# Patient Record
Sex: FEMALE | Race: WHITE | NOT HISPANIC OR LATINO | Employment: FULL TIME | URBAN - NONMETROPOLITAN AREA
[De-identification: names, ages, dates, MRNs, and addresses within clinical notes are randomized per-mention and may not be internally consistent; named-entity substitution may affect disease eponyms.]

---

## 2020-05-11 ENCOUNTER — OFFICE VISIT (OUTPATIENT)
Dept: CARDIOLOGY | Facility: CLINIC | Age: 50
End: 2020-05-11

## 2020-05-11 VITALS
HEART RATE: 104 BPM | SYSTOLIC BLOOD PRESSURE: 142 MMHG | HEIGHT: 62 IN | TEMPERATURE: 97.1 F | DIASTOLIC BLOOD PRESSURE: 88 MMHG | WEIGHT: 162.2 LBS | BODY MASS INDEX: 29.85 KG/M2 | OXYGEN SATURATION: 95 %

## 2020-05-11 DIAGNOSIS — R06.02 SHORTNESS OF BREATH: ICD-10-CM

## 2020-05-11 DIAGNOSIS — R07.2 PRECORDIAL PAIN: Primary | ICD-10-CM

## 2020-05-11 DIAGNOSIS — I10 ESSENTIAL HYPERTENSION: ICD-10-CM

## 2020-05-11 PROCEDURE — 99204 OFFICE O/P NEW MOD 45 MIN: CPT | Performed by: PHYSICIAN ASSISTANT

## 2020-05-11 RX ORDER — DOCUSATE SODIUM 100 MG/1
100 CAPSULE, LIQUID FILLED ORAL DAILY
COMMUNITY
Start: 2020-03-19

## 2020-05-11 RX ORDER — TIZANIDINE 4 MG/1
1 TABLET ORAL 3 TIMES DAILY PRN
COMMUNITY
Start: 2020-03-19

## 2020-05-11 RX ORDER — MONTELUKAST SODIUM 10 MG/1
10 TABLET ORAL NIGHTLY
COMMUNITY
Start: 2020-04-08

## 2020-05-11 RX ORDER — LEVOCETIRIZINE DIHYDROCHLORIDE 5 MG/1
5 TABLET, FILM COATED ORAL EVERY EVENING
COMMUNITY
Start: 2020-04-08

## 2020-05-11 RX ORDER — ESTRADIOL 1 MG/1
1 TABLET ORAL DAILY
COMMUNITY
Start: 2020-05-06

## 2020-05-11 RX ORDER — ALBUTEROL SULFATE 90 UG/1
2 AEROSOL, METERED RESPIRATORY (INHALATION) EVERY 4 HOURS PRN
COMMUNITY

## 2020-05-11 RX ORDER — GABAPENTIN 300 MG/1
800 CAPSULE ORAL 3 TIMES DAILY
COMMUNITY
Start: 2020-04-20

## 2020-05-11 RX ORDER — ASPIRIN 81 MG/1
81 TABLET ORAL DAILY
COMMUNITY

## 2020-05-11 RX ORDER — LOSARTAN POTASSIUM 100 MG/1
100 TABLET ORAL DAILY
COMMUNITY
Start: 2020-03-25 | End: 2020-09-23

## 2020-05-11 RX ORDER — HYDRALAZINE HYDROCHLORIDE 25 MG/1
25 TABLET, FILM COATED ORAL 2 TIMES DAILY
COMMUNITY
Start: 2020-04-06 | End: 2020-09-23

## 2020-05-11 RX ORDER — BUPRENORPHINE HYDROCHLORIDE AND NALOXONE HYDROCHLORIDE DIHYDRATE 8; 2 MG/1; MG/1
1.5 TABLET SUBLINGUAL DAILY
COMMUNITY
Start: 2020-05-01

## 2020-05-11 RX ORDER — OMEPRAZOLE 40 MG/1
40 CAPSULE, DELAYED RELEASE ORAL DAILY
COMMUNITY
Start: 2020-04-08

## 2020-05-11 RX ORDER — BUDESONIDE AND FORMOTEROL FUMARATE DIHYDRATE 160; 4.5 UG/1; UG/1
2 AEROSOL RESPIRATORY (INHALATION)
COMMUNITY

## 2020-05-11 NOTE — PATIENT INSTRUCTIONS
What You Need to Know About Electronic Cigarettes  Electronic cigarettes, or e-cigarettes, are battery-operated devices that deliver nicotine to your body. They come in many shapes, including in the shape of a cigarette, pipe, pen, and even a USB memory stick. E-cigarettes have a cartridge that contains a liquid form of nicotine. When you use the device, the liquid heats up. It then becomes a vapor. Inhaling this vapor is called vaping.  While e-cigarettes do not contain tar and the same cancer-causing chemicals that are in tobacco cigarettes, they may contain other harmful and cancer-causing chemicals, such as formaldehyde or acetaldehyde. Nicotine is thought to increase your risk for certain types of cancer. Many e-cigarettes have chemical colorings and flavorings. It is not clear how much nicotine you get when vaping. The health effects of vaping are not completely known.  Some people may use e-cigarettes in order to quit smoking tobacco. However, this has not been proven to work, and the Food and Drug Administration (FDA) has not approved e-cigarettes for this purpose.  How can using electronic cigarettes affect me?  · E-cigarettes contain nicotine, which is a very addictive drug. Vaping may make you crave nicotine. Nicotine:  ? Changes your blood sugar levels.  ? Increases your heart rate, blood pressure, and breathing rate.  ? Increases your risk of developing blood clots (hypercoagulable state) and diabetes.  · If you smoke e-cigarettes, you may be more likely to start smoking or to smoke more tobacco cigarettes.  · Becoming addicted to nicotine may make your brain more sensitive to other addictive drugs. You may move to other addictive substances.  · If you are pregnant, the nicotine in e-cigarettes may be harmful to your baby. Nicotine can cause:  ? Brain or lung problems for your baby.  ? Your baby to be born too early.  ? Your baby to be born with a low birth weight.  · If you are a child or a teen,  vaping may affect your memory or lower your attention span.  · You may be in danger of overdosing on nicotine. Nicotine poisoning can cause nausea, vomiting, seizures, and trouble breathing.  What are the benefits of stopping vaping?  If you stop vaping, you can avoid:  · Getting addicted to nicotine.  · Having nicotine side effects.  · Getting nicotine poisoning.  · Being exposed to dangerous chemicals.  · Increasing your risk of health problems.  · Increasing your baby's risk of health problems, if you are pregnant.  · Being more likely to use other addictive substances.  What steps can I take to stop vaping?  If you can stop vaping on your own, do it before you become addicted to nicotine. If you need help stopping, ask your health care provider. There are three effective ways to fight nicotine addiction:  · Nicotine replacement therapy. Using nicotine gum or a nicotine patch blocks your craving for nicotine. Over time, you can reduce the amount of nicotine you use until you can stop using nicotine completely without having cravings.  · Prescription medicines approved to fight nicotine addiction. These stop nicotine cravings or block the effects of nicotine.  · Behavioral therapy. This may include:  ? A self-help smoking cessation program.  ? Individual or group therapy.  ? A smoking cessation support group.    Where can I get support?  You can get support at these sites:  · U.S. National Library of Medicine: https://medlineplus.gov/ency/article/595159.htm  · [place]. Department of Health and Human Services: https://smokefree.gov  · American Lung Association: http://www.lung.org/stop-smoking/r-tgyi-dz-quit/how-to-quit-smoking.html  Where can I get more information?  Learn more about e-cigarettes from:  · [place].S. National Institutes of Health: https://www.drugabuse.gov/publications/drugfacts/nqmyxjfkmg-xunedklabq-g-cigarettes  · U.S. Department of Health and Human Services:  http://betobaccofree.hhs.gov/about-tobacco/Electronic-Cigarettes  Summary  · E-cigarettes can cause nicotine addiction.  · E-cigarettes are not approved as a way to stop smoking.  · E-cigarettes are not a risk-free alternative to smoking tobacco.  · There are ways to fight nicotine addiction.  · Talk to your health care provider if you are unable to stop vaping on your own.  This information is not intended to replace advice given to you by your health care provider. Make sure you discuss any questions you have with your health care provider.  Document Released: 04/10/2017 Document Revised: 09/06/2017 Document Reviewed: 12/09/2016  [employer] Interactive Patient Education © 2020 [employer]  Fat and Cholesterol Restricted Eating Plan  Getting too much fat and cholesterol in your diet may cause health problems. Choosing the right foods helps keep your fat and cholesterol at normal levels. This can keep you from getting certain diseases.  Your doctor may recommend an eating plan that includes:  · Total fat: ______% or less of total calories a day.  · Saturated fat: ______% or less of total calories a day.  · Cholesterol: less than _________mg a day.  · Fiber: ______g a day.  What are tips for following this plan?  Meal planning  · At meals, divide your plate into four equal parts:  ? Fill one-half of your plate with vegetables and green salads.  ? Fill one-fourth of your plate with whole grains.  ? Fill one-fourth of your plate with low-fat (lean) protein foods.  · Eat fish that is high in omega-3 fats at least two times a week. This includes mackerel, tuna, sardines, and salmon.  · Eat foods that are high in fiber, such as whole grains, beans, apples, broccoli, carrots, peas, and barley.  General tips    · Work with your doctor to lose weight if you need to.  · Avoid:  ? Foods with added sugar.  ? Fried foods.  ? Foods with partially hydrogenated oils.  · Limit alcohol intake to no more than 1 drink a day for  "nonpregnant women and 2 drinks a day for men. One drink equals 12 oz of beer, 5 oz of wine, or 1½ oz of hard liquor.  Reading food labels  · Check food labels for:  ? Trans fats.  ? Partially hydrogenated oils.  ? Saturated fat (g) in each serving.  ? Cholesterol (mg) in each serving.  ? Fiber (g) in each serving.  · Choose foods with healthy fats, such as:  ? Monounsaturated fats.  ? Polyunsaturated fats.  ? Omega-3 fats.  · Choose grain products that have whole grains. Look for the word \"whole\" as the first word in the ingredient list.  Cooking  · Cook foods using low-fat methods. These include baking, boiling, grilling, and broiling.  · Eat more home-cooked foods. Eat at restaurants and buffets less often.  · Avoid cooking using saturated fats, such as butter, cream, palm oil, palm kernel oil, and coconut oil.  Recommended foods    Fruits  · All fresh, canned (in natural juice), or frozen fruits.  Vegetables  · Fresh or frozen vegetables (raw, steamed, roasted, or grilled). Green salads.  Grains  · Whole grains, such as whole wheat or whole grain breads, crackers, cereals, and pasta. Unsweetened oatmeal, bulgur, barley, quinoa, or brown rice. Corn or whole wheat flour tortillas.  Meats and other protein foods  · Ground beef (85% or leaner), grass-fed beef, or beef trimmed of fat. Skinless chicken or turkey. Ground chicken or turkey. Pork trimmed of fat. All fish and seafood. Egg whites. Dried beans, peas, or lentils. Unsalted nuts or seeds. Unsalted canned beans. Nut butters without added sugar or oil.  Dairy  · Low-fat or nonfat dairy products, such as skim or 1% milk, 2% or reduced-fat cheeses, low-fat and fat-free ricotta or cottage cheese, or plain low-fat and nonfat yogurt.  Fats and oils  · Tub margarine without trans fats. Light or reduced-fat mayonnaise and salad dressings. Avocado. Olive, canola, sesame, or safflower oils.  The items listed above may not be a complete list of foods and beverages you can " eat. Contact a dietitian for more information.  Foods to avoid  Fruits  · Canned fruit in heavy syrup. Fruit in cream or butter sauce. Fried fruit.  Vegetables  · Vegetables cooked in cheese, cream, or butter sauce. Fried vegetables.  Grains  · White bread. White pasta. White rice. Cornbread. Bagels, pastries, and croissants. Crackers and snack foods that contain trans fat and hydrogenated oils.  Meats and other protein foods  · Fatty cuts of meat. Ribs, chicken wings, dalton, sausage, bologna, salami, chitterlings, fatback, hot dogs, bratwurst, and packaged lunch meats. Liver and organ meats. Whole eggs and egg yolks. Chicken and turkey with skin. Fried meat.  Dairy  · Whole or 2% milk, cream, half-and-half, and cream cheese. Whole milk cheeses. Whole-fat or sweetened yogurt. Full-fat cheeses. Nondairy creamers and whipped toppings. Processed cheese, cheese spreads, and cheese curds.  Beverages  · Alcohol. Sugar-sweetened drinks such as sodas, lemonade, and fruit drinks.  Fats and oils  · Butter, stick margarine, lard, shortening, ghee, or dalton fat. Coconut, palm kernel, and palm oils.  Sweets and desserts  · Corn syrup, sugars, honey, and molasses. Candy. Jam and jelly. Syrup. Sweetened cereals. Cookies, pies, cakes, donuts, muffins, and ice cream.  The items listed above may not be a complete list of foods and beverages you should avoid. Contact a dietitian for more information.  Summary  · Choosing the right foods helps keep your fat and cholesterol at normal levels. This can keep you from getting certain diseases.  · At meals, fill one-half of your plate with vegetables and green salads.  · Eat high-fiber foods, like whole grains, beans, apples, carrots, peas, and barley.  · Limit added sugar, saturated fats, alcohol, and fried foods.  This information is not intended to replace advice given to you by your health care provider. Make sure you discuss any questions you have with your health care  "provider.  Document Released: 06/18/2013 Document Revised: 08/21/2019 Document Reviewed: 09/04/2018  [employer] Interactive Patient Education © 2020 [employer]  BMI for Adults    Body mass index (BMI) is a number that is calculated from a person's weight and height. BMI may help to estimate how much of a person's weight is composed of fat. BMI can help identify those who may be at higher risk for certain medical problems.  How is BMI used with adults?  BMI is used as a screening tool to identify possible weight problems. It is used to check whether a person is obese, overweight, healthy weight, or underweight.  How is BMI calculated?  BMI measures your weight and compares it to your height. This can be done either in English (U[place] or metric measurements. Note that charts are available to help you find your BMI quickly and easily without having to do these calculations yourself.  To calculate your BMI in English ([place] measurements, your health care provider will:  1. Measure your weight in pounds (lb).  2. Multiply the number of pounds by 703.  ? For example, for a person who weighs 180 lb, multiply that number by 703, which equals 126,540.  3. Measure your height in inches (in). Then multiply that number by itself to get a measurement called \"inches squared.\"  ? For example, for a person who is 70 in tall, the \"inches squared\" measurement is 70 in x 70 in, which equals 4900 inches squared.  4. Divide the total from Step 2 (number of lb x 703) by the total from Step 3 (inches squared): 126,540 ÷ 4900 = 25.8. This is your BMI.  To calculate your BMI in metric measurements, your health care provider will:  1. Measure your weight in kilograms (kg).  2. Measure your height in meters (m). Then multiply that number by itself to get a measurement called \"meters squared.\"  ? For example, for a person who is 1.75 m tall, the \"meters squared\" measurement is 1.75 m x 1.75 m, which is equal to 3.1 meters squared.  3. Divide " the number of kilograms (your weight) by the meters squared number. In this example: 70 ÷ 3.1 = 22.6. This is your BMI.  How is BMI interpreted?  To interpret your results, your health care provider will use BMI charts to identify whether you are underweight, normal weight, overweight, or obese. The following guidelines will be used:  · Underweight: BMI less than 18.5.  · Normal weight: BMI between 18.5 and 24.9.  · Overweight: BMI between 25 and 29.9.  · Obese: BMI of 30 and above.  Please note:  · Weight includes both fat and muscle, so someone with a muscular build, such as an athlete, may have a BMI that is higher than 24.9. In cases like these, BMI is not an accurate measure of body fat.  · To determine if excess body fat is the cause of a BMI of 25 or higher, further assessments may need to be done by a health care provider.  · BMI is usually interpreted in the same way for men and women.  Why is BMI a useful tool?  BMI is useful in two ways:  · Identifying a weight problem that may be related to a medical condition, or that may increase the risk for medical problems.  · Promoting lifestyle and diet changes in order to reach a healthy weight.  Summary  · Body mass index (BMI) is a number that is calculated from a person's weight and height.  · BMI may help to estimate how much of a person's weight is composed of fat. BMI can help identify those who may be at higher risk for certain medical problems.  · BMI can be measured using English measurements or metric measurements.  · To interpret your results, your health care provider will use BMI charts to identify whether you are underweight, normal weight, overweight, or obese.  This information is not intended to replace advice given to you by your health care provider. Make sure you discuss any questions you have with your health care provider.  Document Released: 08/29/2005 Document Revised: 10/31/2018 Document Reviewed: 10/31/2018  [employer] Interactive Patient  Education © 2020 [employer] Inc.

## 2020-05-11 NOTE — PROGRESS NOTES
Subjective   Meena Goff is a 50 y.o. female     Chief Complaint   Patient presents with   • Establish Care     Here to establish care        HPI  The patient presents today for initial evaluation.  She is referred for further cardiac evaluation and for second opinion.  This pleasant patient has seen cardiology services in the past who evaluated her for extensive dyspnea.  She had a stress test and an echocardiogram a couple of years ago, by her report.  She had a follow-up catheterization which suggested basically normal coronaries by her report.  I do not have the cath report at this time.  Symptoms persisted and became rather severe 6 to 8 months ago.  She was seen by pulmonology services who did not feel that this was pulmonology related.  The patient was referred back to cardiology service, which she saw previously, and was recommended that she had consideration for right heart cath as well.  She feels that an echo was repeated and feels that that was normal.  Her cardiologist at that time did not want to pursue further evaluation with right heart cath.  The patient has now requested a second opinion.    Symptomatically, the patient is doing poorly.  With any level of exertion whatsoever, she develops severe dyspnea.  She will also have mild chest tightness at that time.  The patient has no associated neck, arm, or jaw discomfort.  She is experienced no recent PND or orthopnea.  She has rare palpitations, nothing to suggest sustained dysrhythmic activity.  She has no dizziness or syncope.  The patient is very concerned about symptoms and would like further evaluation.  She presents today in that setting.      Current Outpatient Medications   Medication Sig Dispense Refill   • albuterol sulfate  (90 Base) MCG/ACT inhaler Inhale 2 puffs Every 4 (Four) Hours As Needed for Wheezing.     • aspirin 81 MG EC tablet Take 81 mg by mouth Daily.     • budesonide-formoterol (SYMBICORT) 160-4.5 MCG/ACT inhaler  Inhale 2 puffs 2 (Two) Times a Day.     • buprenorphine-naloxone (SUBOXONE) 8-2 MG per SL tablet Place 1.5 tablets under the tongue Daily.     • diclofenac (VOLTAREN) 50 MG EC tablet Take 1 tablet by mouth 2 (Two) Times a Day As Needed.     • estradiol (ESTRACE) 1 MG tablet Take 1 tablet by mouth Daily.     • gabapentin (NEURONTIN) 300 MG capsule Take 300 mg by mouth 3 (Three) Times a Day.     • HM STOOL SOFTENER 100 MG capsule Take 100 mg by mouth Daily.     • hydrALAZINE (APRESOLINE) 25 MG tablet Take 25 mg by mouth 2 (Two) Times a Day.     • levocetirizine (XYZAL) 5 MG tablet Take 5 mg by mouth Every Evening.     • losartan (COZAAR) 100 MG tablet Take 100 mg by mouth Daily.     • montelukast (SINGULAIR) 10 MG tablet Take 10 mg by mouth Every Night.     • omeprazole (priLOSEC) 40 MG capsule Take 40 mg by mouth Daily.     • tiZANidine (ZANAFLEX) 4 MG tablet Take 1 tablet by mouth 3 (Three) Times a Day As Needed.       No current facility-administered medications for this visit.        Ceftin [cefuroxime] and Indocin [indomethacin]    Past Medical History:   Diagnosis Date   • Acid reflux    • Asthma    • COPD (chronic obstructive pulmonary disease) (CMS/[facility])    • Hypertension    • Leg cramps    • Shortness of breath        Social History     Socioeconomic History   • Marital status:      Spouse name: Not on file   • Number of children: Not on file   • Years of education: Not on file   • Highest education level: Not on file   Tobacco Use   • Smoking status: Former Smoker     Packs/day: 1.50     Years: 30.00     Pack years: 45.00     Types: Electronic Cigarette, Cigarettes     Last attempt to quit: 2015     Years since quittin.3   • Smokeless tobacco: Never Used   • Tobacco comment: Currently uses e-cigs    Substance and Sexual Activity   • Alcohol use: Never     Frequency: Never   • Drug use: Not Currently     Comment: opiates    • Sexual activity: Defer       Family History   Problem Relation Age of  "Onset   • Cancer Mother    • Hypertension Father        Review of Systems   Constitutional: Positive for fatigue (tires easily ). Negative for chills and fever.   HENT: Positive for congestion (seasonal ). Negative for rhinorrhea and sore throat.    Eyes: Positive for visual disturbance (glasses prn ).   Respiratory: Positive for chest tightness and shortness of breath (with exertion mostly, but sometimes at rest ).    Cardiovascular: Positive for palpitations (races at times ). Negative for chest pain and leg swelling.   Gastrointestinal: Negative.  Negative for abdominal pain, blood in stool, nausea and vomiting.   Endocrine: Positive for heat intolerance. Negative for cold intolerance.   Genitourinary: Negative.  Negative for dysuria, frequency, hematuria and urgency.   Musculoskeletal: Positive for arthralgias (joints ). Negative for back pain.   Skin: Negative.  Negative for rash and wound.   Allergic/Immunologic: Positive for environmental allergies (seasonal ). Negative for food allergies.   Neurological: Positive for light-headedness (When BP drops real low, has had 3-4 episodes of this recently ). Negative for dizziness and weakness.   Hematological: Bruises/bleeds easily.   Psychiatric/Behavioral: Positive for sleep disturbance (denies waking with smothering, but can't sleep laying down ). Negative for agitation and confusion. The patient is nervous/anxious.        Objective     Vitals:    05/11/20 1058   BP: 142/88   BP Location: Left arm   Patient Position: Sitting   Pulse: 104   Temp: 97.1 °F (36.2 °C)   SpO2: 95%   Weight: 73.6 kg (162 lb 3.2 oz)   Height: 157.5 cm (62\")        /88 (BP Location: Left arm, Patient Position: Sitting)   Pulse 104   Temp 97.1 °F (36.2 °C)   Ht 157.5 cm (62\")   Wt 73.6 kg (162 lb 3.2 oz)   SpO2 95%   BMI 29.67 kg/m²      Lab Results (most recent)     None          Physical Exam   Constitutional: She is oriented to person, place, and time. She appears " well-developed and well-nourished. No distress.   HENT:   Head: Normocephalic and atraumatic.   Eyes: Pupils are equal, round, and reactive to light. Conjunctivae and EOM are normal.   Neck: Normal range of motion. Neck supple. No JVD present. No tracheal deviation present.   Cardiovascular: Normal rate, regular rhythm, normal heart sounds and intact distal pulses.   Pulmonary/Chest: Effort normal and breath sounds normal.   Abdominal: Soft. Bowel sounds are normal. She exhibits no distension and no mass. There is no tenderness. There is no rebound and no guarding.   Musculoskeletal: Normal range of motion. She exhibits no edema, tenderness or deformity.   Neurological: She is alert and oriented to person, place, and time.   Skin: Skin is warm and dry. No rash noted. No erythema. No pallor.   Psychiatric: She has a normal mood and affect. Her behavior is normal. Judgment and thought content normal.   Nursing note and vitals reviewed.      Procedure   Procedures         Assessment/Plan      Diagnosis Plan   1. Precordial pain  Adult Transthoracic Echo Complete W/ Cont if Necessary Per Protocol    Stress Test With Myocardial Perfusion One Day   2. Shortness of breath  Adult Transthoracic Echo Complete W/ Cont if Necessary Per Protocol    Stress Test With Myocardial Perfusion One Day   3. Essential hypertension  Adult Transthoracic Echo Complete W/ Cont if Necessary Per Protocol    Stress Test With Myocardial Perfusion One Day     1.  At this time, I would like to schedule the patient for repeat noninvasive cardiac evaluation.  We will schedule for stress test just to start for a stratification and ischemia assessment otherwise.    2.  I will schedule for an echocardiogram as well.  This will allow us to evaluate LV systolic function, diastolic parameters, right-sided pressure/parameters, valvular morphologies, etc.    3.  Blood pressures appear to be fairly well controlled and improving at this time.  Losartan was  recently increased by her primary care provider.  She does appear to be improving with regards to average blood pressures numbers.  She will monitor that at home and call to us for any issues.    4.  We will see the patient back after stress and echo findings and recommend her further at that time.  We can evaluate the need for further evaluation if indicated at that time.  She will call to us for any issues prior to follow-up.  Further pending above.  For now, we will make no modifications in medical regimen.           Meena Palmerlaronrosy  reports that she quit smoking about 5 years ago. Her smoking use included electronic cigarette and cigarettes. She has a 45.00 pack-year smoking history. She has never used smokeless tobacco.. I have educated her on the risk of diseases from using tobacco products such as cancer, COPD and heart diease.     I advised her to quit and she is not willing to quit.       Patient's Body mass index is 29.67 kg/m². BMI is above normal parameters. Recommendations include: educational material and referral to primary care.           Electronically signed by:

## 2020-06-02 ENCOUNTER — HOSPITAL ENCOUNTER (OUTPATIENT)
Dept: CARDIOLOGY | Facility: HOSPITAL | Age: 50
Discharge: HOME | End: 2020-06-02

## 2020-06-02 VITALS — BODY MASS INDEX: 29.86 KG/M2 | WEIGHT: 162.26 LBS | HEIGHT: 62 IN

## 2020-06-02 DIAGNOSIS — I10 ESSENTIAL HYPERTENSION: ICD-10-CM

## 2020-06-02 DIAGNOSIS — R07.2 PRECORDIAL PAIN: ICD-10-CM

## 2020-06-02 DIAGNOSIS — R06.02 SHORTNESS OF BREATH: ICD-10-CM

## 2020-06-02 PROCEDURE — 78452 HT MUSCLE IMAGE SPECT MULT: CPT | Performed by: INTERNAL MEDICINE

## 2020-06-02 PROCEDURE — 0 TECHNETIUM SESTAMIBI: Performed by: INTERNAL MEDICINE

## 2020-06-02 PROCEDURE — A9500 TC99M SESTAMIBI: HCPCS | Performed by: INTERNAL MEDICINE

## 2020-06-02 PROCEDURE — 93306 TTE W/DOPPLER COMPLETE: CPT

## 2020-06-02 PROCEDURE — 93018 CV STRESS TEST I&R ONLY: CPT | Performed by: INTERNAL MEDICINE

## 2020-06-02 PROCEDURE — 93306 TTE W/DOPPLER COMPLETE: CPT | Performed by: INTERNAL MEDICINE

## 2020-06-02 PROCEDURE — 93016 CV STRESS TEST SUPVJ ONLY: CPT | Performed by: NURSE PRACTITIONER

## 2020-06-02 PROCEDURE — 78452 HT MUSCLE IMAGE SPECT MULT: CPT

## 2020-06-02 PROCEDURE — 93017 CV STRESS TEST TRACING ONLY: CPT

## 2020-06-02 RX ADMIN — TECHNETIUM TC 99M SESTAMIBI 1 DOSE: 1 INJECTION INTRAVENOUS at 13:30

## 2020-06-02 RX ADMIN — TECHNETIUM TC 99M SESTAMIBI 1 DOSE: 1 INJECTION INTRAVENOUS at 12:42

## 2020-06-07 LAB
BH CV NUCLEAR PRIOR STUDY: 2
BH CV STRESS BP STAGE 1: NORMAL
BH CV STRESS BP STAGE 2: NORMAL
BH CV STRESS DURATION MIN STAGE 1: 3
BH CV STRESS DURATION MIN STAGE 2: 3
BH CV STRESS DURATION SEC STAGE 1: 0
BH CV STRESS DURATION SEC STAGE 2: 0
BH CV STRESS GRADE STAGE 1: 10
BH CV STRESS GRADE STAGE 2: 12
BH CV STRESS HR STAGE 1: 135
BH CV STRESS HR STAGE 2: 147
BH CV STRESS METS STAGE 1: 5
BH CV STRESS METS STAGE 2: 7.5
BH CV STRESS PROTOCOL 1: NORMAL
BH CV STRESS RECOVERY BP: NORMAL MMHG
BH CV STRESS RECOVERY HR: 105 BPM
BH CV STRESS SPEED STAGE 1: 1.7
BH CV STRESS SPEED STAGE 2: 2.5
BH CV STRESS STAGE 1: 1
BH CV STRESS STAGE 2: 2
MAXIMAL PREDICTED HEART RATE: 168 BPM
PERCENT MAX PREDICTED HR: 87.5 %
STRESS BASELINE BP: NORMAL MMHG
STRESS BASELINE HR: 90 BPM
STRESS PERCENT HR: 103 %
STRESS POST ESTIMATED WORKLOAD: 4.6 METS
STRESS POST EXERCISE DUR MIN: 3 MIN
STRESS POST EXERCISE DUR SEC: 45 SEC
STRESS POST PEAK BP: NORMAL MMHG
STRESS POST PEAK HR: 147 BPM
STRESS TARGET HR: 143 BPM

## 2020-06-08 ENCOUNTER — TELEPHONE (OUTPATIENT)
Dept: CARDIOLOGY | Facility: CLINIC | Age: 50
End: 2020-06-08

## 2020-06-08 NOTE — TELEPHONE ENCOUNTER
Informed pt that we haven't received her echo results yet and that we will contact her once her stress has been reviewed, she verbalized understanding.

## 2020-06-08 NOTE — TELEPHONE ENCOUNTER
----- Message from Alem Nbole MA sent at 6/8/2020  3:44 PM EDT -----  No results from [facility] on stress yet and it looks like echo has not been read.   ----- Message -----  From: Selina Chappell  Sent: 6/8/2020   1:47 PM EDT  To: Alem Noble MA    Pt LVM requesting her testing results.   #[phone]    Per chart review, echo results aren't in yet. Looks like EF is good.     Did you receive any instructions on pt's results from [facility]?

## 2020-06-10 LAB
AORTIC DIMENSIONLESS INDEX: 0.9 (DI)
BH CV ECHO MEAS - ACS: 1.7 CM
BH CV ECHO MEAS - AO MAX PG (FULL): 1 MMHG
BH CV ECHO MEAS - AO MAX PG: 5 MMHG
BH CV ECHO MEAS - AO MEAN PG (FULL): 1 MMHG
BH CV ECHO MEAS - AO MEAN PG: 3 MMHG
BH CV ECHO MEAS - AO ROOT AREA: 4.9 CM^2
BH CV ECHO MEAS - AO ROOT DIAM: 2.5 CM
BH CV ECHO MEAS - AO V2 MAX: 112 CM/SEC
BH CV ECHO MEAS - AO V2 MEAN: 75.4 CM/SEC
BH CV ECHO MEAS - AO V2 VTI: 22.1 CM
BH CV ECHO MEAS - BZI_METRIC_HEIGHT: 157.5 CM
BH CV ECHO MEAS - EDV(CUBED): 65.5 ML
BH CV ECHO MEAS - EDV(TEICH): 71.3 ML
BH CV ECHO MEAS - EF(CUBED): 66.8 %
BH CV ECHO MEAS - EF(TEICH): 58.9 %
BH CV ECHO MEAS - ESV(CUBED): 21.7 ML
BH CV ECHO MEAS - ESV(TEICH): 29.3 ML
BH CV ECHO MEAS - FS: 30.8 %
BH CV ECHO MEAS - IVS/LVPW: 1.2
BH CV ECHO MEAS - IVSD: 1 CM
BH CV ECHO MEAS - LA DIMENSION: 3.5 CM
BH CV ECHO MEAS - LA/AO: 1.4
BH CV ECHO MEAS - LAT PEAK E' VEL: 14.1 CM/SEC
BH CV ECHO MEAS - LV IVRT: 0.11 SEC
BH CV ECHO MEAS - LV MASS(C)D: 117.7 GRAMS
BH CV ECHO MEAS - LV MAX PG: 4 MMHG
BH CV ECHO MEAS - LV MEAN PG: 2 MMHG
BH CV ECHO MEAS - LV V1 MAX: 99.8 CM/SEC
BH CV ECHO MEAS - LV V1 MEAN: 53.4 CM/SEC
BH CV ECHO MEAS - LV V1 VTI: 19.6 CM
BH CV ECHO MEAS - LVIDD: 4 CM
BH CV ECHO MEAS - LVIDS: 2.8 CM
BH CV ECHO MEAS - LVPWD: 0.86 CM
BH CV ECHO MEAS - MED PEAK E' VEL: 9.5 CM/SEC
BH CV ECHO MEAS - MV A MAX VEL: 76.2 CM/SEC
BH CV ECHO MEAS - MV DEC SLOPE: 520 CM/SEC^2
BH CV ECHO MEAS - MV DEC TIME: 0.12 SEC
BH CV ECHO MEAS - MV E MAX VEL: 106 CM/SEC
BH CV ECHO MEAS - MV E/A: 1.4
BH CV ECHO MEAS - MV MAX PG: 5 MMHG
BH CV ECHO MEAS - MV MEAN PG: 2 MMHG
BH CV ECHO MEAS - MV P1/2T MAX VEL: 114 CM/SEC
BH CV ECHO MEAS - MV P1/2T: 64.2 MSEC
BH CV ECHO MEAS - MV V2 MAX: 112 CM/SEC
BH CV ECHO MEAS - MV V2 MEAN: 69.5 CM/SEC
BH CV ECHO MEAS - MV V2 VTI: 30.1 CM
BH CV ECHO MEAS - MVA P1/2T LCG: 1.9 CM^2
BH CV ECHO MEAS - MVA(P1/2T): 3.4 CM^2
BH CV ECHO MEAS - PA MAX PG (FULL): 2.4 MMHG
BH CV ECHO MEAS - PA MAX PG: 7.1 MMHG
BH CV ECHO MEAS - PA MEAN PG (FULL): 1 MMHG
BH CV ECHO MEAS - PA MEAN PG: 4 MMHG
BH CV ECHO MEAS - PA V2 MAX: 133 CM/SEC
BH CV ECHO MEAS - PA V2 MEAN: 92.5 CM/SEC
BH CV ECHO MEAS - PA V2 VTI: 26.9 CM
BH CV ECHO MEAS - PI END-D VEL: 110 CM/SEC
BH CV ECHO MEAS - RAP SYSTOLE: 10 MMHG
BH CV ECHO MEAS - RV MAX PG: 4.7 MMHG
BH CV ECHO MEAS - RV MEAN PG: 3 MMHG
BH CV ECHO MEAS - RV V1 MAX: 108 CM/SEC
BH CV ECHO MEAS - RV V1 MEAN: 73.4 CM/SEC
BH CV ECHO MEAS - RV V1 VTI: 21.8 CM
BH CV ECHO MEAS - RVDD: 2.8 CM
BH CV ECHO MEAS - RVSP: 25 MMHG
BH CV ECHO MEAS - SV(AO): 108.5 ML
BH CV ECHO MEAS - SV(CUBED): 43.7 ML
BH CV ECHO MEAS - SV(TEICH): 42 ML
BH CV ECHO MEAS - TR MAX VEL: 193 CM/SEC
BH CV ECHO MEASUREMENTS AVERAGE E/E' RATIO: 8.98
MAXIMAL PREDICTED HEART RATE: 168 BPM
STRESS TARGET HR: 143 BPM

## 2020-06-11 ENCOUNTER — TELEPHONE (OUTPATIENT)
Dept: CARDIOLOGY | Facility: CLINIC | Age: 50
End: 2020-06-11

## 2020-06-11 NOTE — TELEPHONE ENCOUNTER
Kim  msg sent to patient with results. She is keep follow up and call or come in sooner as needed.  LIVIER POSADAS    Follow up on 8/20/20 at 3:15pm    ----- Message from LETY Self sent at 6/10/2020  8:49 AM EDT -----  Routine follow-up.      Stress:  1.  Poor functional capacity with chest pain and dyspnea reported in stage I and early stage II of the Carlos protocol.     2.  Somewhat exaggerated but not pathologic heart rate and blood pressure responses to exercise suggestive of physical deconditioning.     3.  No EKG evidence of ischemia.  No exercise-induced dysrhythmia.     4.  No scintigraphic evidence of ischemia.     5.  Preserved post-rest ejection fraction of 68% with no focal wall motion abnormalities.     6.  No evidence of transient ischemic dilation or of increased lung uptake of radiopharmaceutical.      Echo:  1.  Technically limited study.     2.  Grossly within normal limits.

## 2020-09-23 ENCOUNTER — OFFICE VISIT (OUTPATIENT)
Dept: CARDIOLOGY | Facility: CLINIC | Age: 50
End: 2020-09-23

## 2020-09-23 VITALS
WEIGHT: 159 LBS | TEMPERATURE: 96.9 F | SYSTOLIC BLOOD PRESSURE: 142 MMHG | OXYGEN SATURATION: 100 % | DIASTOLIC BLOOD PRESSURE: 68 MMHG | BODY MASS INDEX: 29.26 KG/M2 | HEIGHT: 62 IN | HEART RATE: 90 BPM

## 2020-09-23 DIAGNOSIS — R06.02 SHORTNESS OF BREATH: Primary | ICD-10-CM

## 2020-09-23 DIAGNOSIS — R00.2 PALPITATIONS: ICD-10-CM

## 2020-09-23 DIAGNOSIS — I10 ESSENTIAL HYPERTENSION: ICD-10-CM

## 2020-09-23 PROCEDURE — 99213 OFFICE O/P EST LOW 20 MIN: CPT | Performed by: PHYSICIAN ASSISTANT

## 2020-09-23 RX ORDER — HYDROCHLOROTHIAZIDE 12.5 MG/1
12.5 TABLET ORAL DAILY
COMMUNITY

## 2020-09-23 RX ORDER — LISINOPRIL 20 MG/1
20 TABLET ORAL DAILY
COMMUNITY

## 2020-09-23 NOTE — PATIENT INSTRUCTIONS
For more information:    Quit Now [place]  1-800-QUIT-NOW  https://[place].quitlogix.org/en-[place]/  Steps to Quit Smoking  Smoking tobacco can be harmful to your health and can affect almost every organ in your body. Smoking puts you, and those around you, at risk for developing many serious chronic diseases. Quitting smoking is difficult, but it is one of the best things that you can do for your health. It is never too late to quit.  What are the benefits of quitting smoking?  When you quit smoking, you lower your risk of developing serious diseases and conditions, such as:  · Lung cancer or lung disease, such as COPD.  · Heart disease.  · Stroke.  · Heart attack.  · Infertility.  · Osteoporosis and bone fractures.  Additionally, symptoms such as coughing, wheezing, and shortness of breath may get better when you quit. You may also find that you get sick less often because your body is stronger at fighting off colds and infections. If you are pregnant, quitting smoking can help to reduce your chances of having a baby of low birth weight.  How do I get ready to quit?  When you decide to quit smoking, create a plan to make sure that you are successful. Before you quit:  · Pick a date to quit. Set a date within the next two weeks to give you time to prepare.  · Write down the reasons why you are quitting. Keep this list in places where you will see it often, such as on your bathroom mirror or in your car or wallet.  · Identify the people, places, things, and activities that make you want to smoke (triggers) and avoid them. Make sure to take these actions:  ¨ Throw away all cigarettes at home, at work, and in your car.  ¨ Throw away smoking accessories, such as ashtrays and lighters.  ¨ Clean your car and make sure to empty the ashtray.  ¨ Clean your home, including curtains and carpets.  · Tell your family, friends, and coworkers that you are quitting. Support from your loved ones can make quitting easier.  · Talk with  your health care provider about your options for quitting smoking.  · Find out what treatment options are covered by your health insurance.  What strategies can I use to quit smoking?  Talk with your healthcare provider about different strategies to quit smoking. Some strategies include:  · Quitting smoking altogether instead of gradually lessening how much you smoke over a period of time. Research shows that quitting “cold turkey” is more successful than gradually quitting.  · Attending in-person counseling to help you build problem-solving skills. You are more likely to have success in quitting if you attend several counseling sessions. Even short sessions of 10 minutes can be effective.  · Finding resources and support systems that can help you to quit smoking and remain smoke-free after you quit. These resources are most helpful when you use them often. They can include:  ¨ Online chats with a counselor.  ¨ Telephone quitlines.  ¨ Printed self-help materials.  ¨ Support groups or group counseling.  ¨ Text messaging programs.  ¨ Mobile phone applications.  · Taking medicines to help you quit smoking. (If you are pregnant or breastfeeding, talk with your health care provider first.) Some medicines contain nicotine and some do not. Both types of medicines help with cravings, but the medicines that include nicotine help to relieve withdrawal symptoms. Your health care provider may recommend:  ¨ Nicotine patches, gum, or lozenges.  ¨ Nicotine inhalers or sprays.  ¨ Non-nicotine medicine that is taken by mouth.  Talk with your health care provider about combining strategies, such as taking medicines while you are also receiving in-person counseling. Using these two strategies together makes you more likely to succeed in quitting than if you used either strategy on its own.  If you are pregnant or breastfeeding, talk with your health care provider about finding counseling or other support strategies to quit smoking. Do  not take medicine to help you quit smoking unless told to do so by your health care provider.  What things can I do to make it easier to quit?  Quitting smoking might feel overwhelming at first, but there is a lot that you can do to make it easier. Take these important actions:  · Reach out to your family and friends and ask that they support and encourage you during this time. Call telephone quitlines, reach out to support groups, or work with a counselor for support.  · Ask people who smoke to avoid smoking around you.  · Avoid places that trigger you to smoke, such as bars, parties, or smoke-break areas at work.  · Spend time around people who do not smoke.  · Lessen stress in your life, because stress can be a smoking trigger for some people. To lessen stress, try:  ¨ Exercising regularly.  ¨ Deep-breathing exercises.  ¨ Yoga.  ¨ Meditating.  ¨ Performing a body scan. This involves closing your eyes, scanning your body from head to toe, and noticing which parts of your body are particularly tense. Purposefully relax the muscles in those areas.  · Download or purchase mobile phone or tablet apps (applications) that can help you stick to your quit plan by providing reminders, tips, and encouragement. There are many free apps, such as QuitGuide from the CDC (Centers for Disease Control and Prevention). You can find other support for quitting smoking (smoking cessation) through smokefree.gov and other websites.  How will I feel when I quit smoking?  Within the first 24 hours of quitting smoking, you may start to feel some withdrawal symptoms. These symptoms are usually most noticeable 2-3 days after quitting, but they usually do not last beyond 2-3 weeks. Changes or symptoms that you might experience include:  · Mood swings.  · Restlessness, anxiety, or irritation.  · Difficulty concentrating.  · Dizziness.  · Strong cravings for sugary foods in addition to nicotine.  · Mild weight  gain.  · Constipation.  · Nausea.  · Coughing or a sore throat.  · Changes in how your medicines work in your body.  · A depressed mood.  · Difficulty sleeping (insomnia).  After the first 2-3 weeks of quitting, you may start to notice more positive results, such as:  · Improved sense of smell and taste.  · Decreased coughing and sore throat.  · Slower heart rate.  · Lower blood pressure.  · Clearer skin.  · The ability to breathe more easily.  · Fewer sick days.  Quitting smoking is very challenging for most people. Do not get discouraged if you are not successful the first time. Some people need to make many attempts to quit before they achieve long-term success. Do your best to stick to your quit plan, and talk with your health care provider if you have any questions or concerns.  This information is not intended to replace advice given to you by your health care provider. Make sure you discuss any questions you have with your health care provider.  Document Released: 12/12/2002 Document Revised: 08/15/2017 Document Reviewed: 05/03/2016  [employer] Patient Education © 2017 [employer]  Heart-Healthy Eating Plan  Many factors influence your heart (coronary) health, including eating and exercise habits. Coronary risk increases with abnormal blood fat (lipid) levels. Heart-healthy meal planning includes limiting unhealthy fats, increasing healthy fats, and making other diet and lifestyle changes.  What is my plan?  Your health care provider may recommend that you:  · Limit your fat intake to _________% or less of your total calories each day.  · Limit your saturated fat intake to _________% or less of your total calories each day.  · Limit the amount of cholesterol in your diet to less than _________ mg per day.  What are tips for following this plan?  Cooking  Cook foods using methods other than frying. Baking, boiling, grilling, and broiling are all good options. Other ways to reduce fat  include:  · Removing the skin from poultry.  · Removing all visible fats from meats.  · Steaming vegetables in water or broth.  Meal planning    · At meals, imagine dividing your plate into fourths:  ? Fill one-half of your plate with vegetables and green salads.  ? Fill one-fourth of your plate with whole grains.  ? Fill one-fourth of your plate with lean protein foods.  · Eat 4-5 servings of vegetables per day. One serving equals 1 cup raw or cooked vegetable, or 2 cups raw leafy greens.  · Eat 4-5 servings of fruit per day. One serving equals 1 medium whole fruit, ¼ cup dried fruit, ½ cup fresh, frozen, or canned fruit, or ½ cup 100% fruit juice.  · Eat more foods that contain soluble fiber. Examples include apples, broccoli, carrots, beans, peas, and barley. Aim to get 25-30 g of fiber per day.  · Increase your consumption of legumes, nuts, and seeds to 4-5 servings per week. One serving of dried beans or legumes equals ½ cup cooked, 1 serving of nuts is ¼ cup, and 1 serving of seeds equals 1 tablespoon.  Fats  · Choose healthy fats more often. Choose monounsaturated and polyunsaturated fats, such as olive and canola oils, flaxseeds, walnuts, almonds, and seeds.  · Eat more omega-3 fats. Choose salmon, mackerel, sardines, tuna, flaxseed oil, and ground flaxseeds. Aim to eat fish at least 2 times each week.  · Check food labels carefully to identify foods with trans fats or high amounts of saturated fat.  · Limit saturated fats. These are found in animal products, such as meats, butter, and cream. Plant sources of saturated fats include palm oil, palm kernel oil, and coconut oil.  · Avoid foods with partially hydrogenated oils in them. These contain trans fats. Examples are stick margarine, some tub margarines, cookies, crackers, and other baked goods.  · Avoid fried foods.  General information  · Eat more home-cooked food and less restaurant, buffet, and fast food.  · Limit or avoid alcohol.  · Limit foods that  are high in starch and sugar.  · Lose weight if you are overweight. Losing just 5-10% of your body weight can help your overall health and prevent diseases such as diabetes and heart disease.  · Monitor your salt (sodium) intake, especially if you have high blood pressure. Talk with your health care provider about your sodium intake.  · Try to incorporate more vegetarian meals weekly.  What foods can I eat?  Fruits  All fresh, canned (in natural juice), or frozen fruits.  Vegetables  Fresh or frozen vegetables (raw, steamed, roasted, or grilled). Green salads.  Grains  Most grains. Choose whole wheat and whole grains most of the time. Rice and pasta, including brown rice and pastas made with whole wheat.  Meats and other proteins  Lean, well-trimmed beef, veal, pork, and lamb. Chicken and turkey without skin. All fish and shellfish. Wild duck, rabbit, pheasant, and venison. Egg whites or low-cholesterol egg substitutes. Dried beans, peas, lentils, and tofu. Seeds and most nuts.  Dairy  Low-fat or nonfat cheeses, including ricotta and mozzarella. Skim or 1% milk (liquid, powdered, or evaporated). Buttermilk made with low-fat milk. Nonfat or low-fat yogurt.  Fats and oils  Non-hydrogenated (trans-free) margarines. Vegetable oils, including soybean, sesame, sunflower, olive, peanut, safflower, corn, canola, and cottonseed. Salad dressings or mayonnaise made with a vegetable oil.  Beverages  Water (mineral or sparkling). Coffee and tea. Diet carbonated beverages.  Sweets and desserts  Sherbet, gelatin, and fruit ice. Small amounts of dark chocolate.  Limit all sweets and desserts.  Seasonings and condiments  All seasonings and condiments.  The items listed above may not be a complete list of foods and beverages you can eat. Contact a dietitian for more options.  What foods are not recommended?  Fruits  Canned fruit in heavy syrup. Fruit in cream or butter sauce. Fried fruit. Limit coconut.  Vegetables  Vegetables cooked  in cheese, cream, or butter sauce. Fried vegetables.  Grains  Breads made with saturated or trans fats, oils, or whole milk. Croissants. Sweet rolls. Donuts. High-fat crackers, such as cheese crackers.  Meats and other proteins  Fatty meats, such as hot dogs, ribs, sausage, dalton, rib-eye roast or steak. High-fat deli meats, such as salami and bologna. Caviar. Domestic duck and goose. Organ meats, such as liver.  Dairy  Cream, sour cream, cream cheese, and creamed cottage cheese. Whole milk cheeses. Whole or 2% milk (liquid, evaporated, or condensed). Whole buttermilk. Cream sauce or high-fat cheese sauce. Whole-milk yogurt.  Fats and oils  Meat fat, or shortening. Cocoa butter, hydrogenated oils, palm oil, coconut oil, palm kernel oil. Solid fats and shortenings, including dalton fat, salt pork, lard, and butter. Nondairy cream substitutes. Salad dressings with cheese or sour cream.  Beverages  Regular sodas and any drinks with added sugar.  Sweets and desserts  Frosting. Pudding. Cookies. Cakes. Pies. Milk chocolate or white chocolate. Buttered syrups. Full-fat ice cream or ice cream drinks.  The items listed above may not be a complete list of foods and beverages to avoid. Contact a dietitian for more information.  Summary  · Heart-healthy meal planning includes limiting unhealthy fats, increasing healthy fats, and making other diet and lifestyle changes.  · Lose weight if you are overweight. Losing just 5-10% of your body weight can help your overall health and prevent diseases such as diabetes and heart disease.  · Focus on eating a balance of foods, including fruits and vegetables, low-fat or nonfat dairy, lean protein, nuts and legumes, whole grains, and heart-healthy oils and fats.  This information is not intended to replace advice given to you by your health care provider. Make sure you discuss any questions you have with your health care provider.  Document Released: 09/26/2009 Document Revised: 01/25/2019  Document Reviewed: 01/25/2019  [employer]vier Patient Education © 2020 [employer]

## 2020-09-23 NOTE — PROGRESS NOTES
Problem list     Subjective   Meena Goff is a 50 y.o. female     Chief Complaint   Patient presents with   • Chest Pain     presents for stress and echo f/u   • Palpitations   • Shortness of Breath   • Hypertension       HPI  This pleasant patient presents back today to review study results.  She was seen to establish care at last evaluation.  Previous cardiac history includes largely negative and unremarkable cardiac work-up 2 years ago through an alternate cardiology service.  Noninvasive studies were benign and she eventually had a catheterization, which was benign as well.  She had a follow-up pulmonology evaluation, also being benign.  She was referred to us for consideration of evaluation for which we did see her at last clinic visit.  She was scheduled for nuclear stress test and an echo.  Nuclear stress test indicated no evidence of ischemia.  Echo was felt to be normal.  Of note, she had an exaggerated, early response from heart rate and blood pressure to low levels of activity suggesting deconditioning.  We have reviewed this in detail with the patient.    At this time, symptomatically, she still has dyspnea which limits activity.  She has significant tachycardia and palpitations with low levels of activity.  She reports no continued chest pain at this time.  She does feel that blood pressures have been largely well controlled on current medical regimen.  Laboratories are followed with her primary care provider and felt to be normal by patient.  The patient has no further complaints otherwise.    Current Outpatient Medications on File Prior to Visit   Medication Sig Dispense Refill   • albuterol sulfate  (90 Base) MCG/ACT inhaler Inhale 2 puffs Every 4 (Four) Hours As Needed for Wheezing.     • aspirin 81 MG EC tablet Take 81 mg by mouth Daily.     • budesonide-formoterol (SYMBICORT) 160-4.5 MCG/ACT inhaler Inhale 2 puffs 2 (Two) Times a Day.     • buprenorphine-naloxone (SUBOXONE) 8-2 MG per SL  tablet Place 1.5 tablets under the tongue Daily.     • diclofenac (VOLTAREN) 50 MG EC tablet Take 1 tablet by mouth 2 (Two) Times a Day As Needed.     • estradiol (ESTRACE) 1 MG tablet Take 1 tablet by mouth Daily.     • gabapentin (NEURONTIN) 300 MG capsule Take 800 mg by mouth 3 (Three) Times a Day.     • HM STOOL SOFTENER 100 MG capsule Take 100 mg by mouth Daily.     • hydroCHLOROthiazide (HYDRODIURIL) 12.5 MG tablet Take 12.5 mg by mouth Daily.     • levocetirizine (XYZAL) 5 MG tablet Take 5 mg by mouth Every Evening.     • lisinopril (PRINIVIL,ZESTRIL) 20 MG tablet Take 20 mg by mouth Daily.     • montelukast (SINGULAIR) 10 MG tablet Take 10 mg by mouth Every Night.     • omeprazole (priLOSEC) 40 MG capsule Take 40 mg by mouth Daily.     • tiZANidine (ZANAFLEX) 4 MG tablet Take 1 tablet by mouth 3 (Three) Times a Day As Needed.       No current facility-administered medications on file prior to visit.        Ceftin [cefuroxime] and Indocin [indomethacin]    Past Medical History:   Diagnosis Date   • Acid reflux    • Asthma    • COPD (chronic obstructive pulmonary disease) (CMS/[facility])    • Hypertension    • Leg cramps    • Shortness of breath        Social History     Socioeconomic History   • Marital status:      Spouse name: Not on file   • Number of children: Not on file   • Years of education: Not on file   • Highest education level: Not on file   Tobacco Use   • Smoking status: Former Smoker     Packs/day: 1.50     Years: 30.00     Pack years: 45.00     Types: Electronic Cigarette, Cigarettes     Quit date:      Years since quittin.7   • Smokeless tobacco: Never Used   • Tobacco comment: Currently uses e-cigs    Substance and Sexual Activity   • Alcohol use: Never     Frequency: Never   • Drug use: Not Currently     Comment: opiates    • Sexual activity: Defer       Family History   Problem Relation Age of Onset   • Cancer Mother    • Hypertension Father        Review of Systems    "  Constitutional: Positive for fatigue. Negative for chills, diaphoresis and fever.   HENT: Negative.    Eyes: Positive for visual disturbance.   Respiratory: Positive for chest tightness, shortness of breath (with activity and times at rest) and wheezing. Negative for apnea and cough.    Cardiovascular: Positive for chest pain (occas) and palpitations (racing). Negative for leg swelling.   Gastrointestinal: Negative.  Negative for abdominal pain, blood in stool, constipation, diarrhea, nausea and vomiting.   Endocrine: Negative.    Genitourinary: Negative.  Negative for hematuria.   Musculoskeletal: Positive for arthralgias and myalgias. Negative for back pain and neck pain.   Skin: Negative.  Negative for rash and wound.   Allergic/Immunologic: Positive for environmental allergies. Negative for food allergies.   Neurological: Positive for dizziness. Negative for syncope, weakness, light-headedness, numbness and headaches.   Hematological: Bruises/bleeds easily.   Psychiatric/Behavioral: Positive for agitation and sleep disturbance (sleeps elevated to prevent smothering, insomnia). The patient is nervous/anxious.        Objective   Vitals:    09/23/20 1043   BP: 142/68   BP Location: Left arm   Patient Position: Sitting   Pulse: 90   Temp: 96.9 °F (36.1 °C)   SpO2: 100%   Weight: 72.1 kg (159 lb)   Height: 157.5 cm (62\")      /68 (BP Location: Left arm, Patient Position: Sitting)   Pulse 90   Temp 96.9 °F (36.1 °C)   Ht 157.5 cm (62\")   Wt 72.1 kg (159 lb)   SpO2 100%   BMI 29.08 kg/m²    Lab Results (most recent)     None        Physical Exam  Vitals signs and nursing note reviewed.   Constitutional:       General: She is not in acute distress.     Appearance: She is well-developed.   HENT:      Head: Normocephalic and atraumatic.   Eyes:      Conjunctiva/sclera: Conjunctivae normal.      Pupils: Pupils are equal, round, and reactive to light.   Neck:      Musculoskeletal: Normal range of motion and " neck supple.      Vascular: No JVD.      Trachea: No tracheal deviation.   Cardiovascular:      Rate and Rhythm: Normal rate and regular rhythm.      Heart sounds: Normal heart sounds.   Pulmonary:      Effort: Pulmonary effort is normal.      Breath sounds: Normal breath sounds.   Abdominal:      General: Bowel sounds are normal. There is no distension.      Palpations: Abdomen is soft. There is no mass.      Tenderness: There is no abdominal tenderness. There is no guarding or rebound.   Musculoskeletal: Normal range of motion.         General: No tenderness or deformity.   Skin:     General: Skin is warm and dry.      Coloration: Skin is not pale.      Findings: No erythema or rash.   Neurological:      Mental Status: She is alert and oriented to person, place, and time.   Psychiatric:         Behavior: Behavior normal.         Thought Content: Thought content normal.         Judgment: Judgment normal.           Procedure   Procedures       Assessment/Plan      Diagnosis Plan   1. Shortness of breath     2. Essential hypertension     3. Palpitations       1.  At this time, the patient appears to be doing well from cardiovascular standpoint.  Her main complaint still is with severe dyspnea and episodes of palpitations with low level activity and low levels of exertion.    2.  Her stress test and her echo studies performed just recently through the office were very much benign.  Her stress test did support exaggerated blood pressure and heart rate response to very low levels of activity, supporting deconditioning.  I have reviewed this with the patient.  I feel the same is likely contributing to her symptoms.    3.  With a benign cardiovascular work-up and pulmonary work-up for her symptoms, I do not feel anything would be warranted at this time.    4.  She appears to be normotensive on current medical regimen.  We will continue medical regimen/antihypertensive therapy without change.    5.  Nothing further at this  time and we will anticipate seeing her on yearly evaluations at this time.  She will call for continued issues or for progressive symptoms.           Meena Goff  reports that she quit smoking about 5 years ago. Her smoking use included electronic cigarette and cigarettes. She has a 45.00 pack-year smoking history. She has never used smokeless tobacco.. I have educated her on the risk of diseases from using tobacco products such as cancer, COPD and heart diease.     I advised her to quit and she is not willing to quit.    I spent 3  minutes counseling the patient.        Patient's Body mass index is 29.08 kg/m². BMI is above normal parameters. Recommendations include: educational material.             Electronically signed by: